# Patient Record
Sex: FEMALE | Race: WHITE
[De-identification: names, ages, dates, MRNs, and addresses within clinical notes are randomized per-mention and may not be internally consistent; named-entity substitution may affect disease eponyms.]

---

## 2019-04-12 ENCOUNTER — HOSPITAL ENCOUNTER (OUTPATIENT)
Age: 64
End: 2019-04-12
Payer: COMMERCIAL

## 2019-04-12 DIAGNOSIS — Z12.31: Primary | ICD-10-CM

## 2019-04-12 DIAGNOSIS — Z80.3: ICD-10-CM

## 2019-04-12 PROCEDURE — 77063 BREAST TOMOSYNTHESIS BI: CPT

## 2019-04-12 PROCEDURE — 77067 SCR MAMMO BI INCL CAD: CPT

## 2019-04-12 NOTE — DI.MG.S_ITS
BILATERAL DIGITAL SCREENING MAMMOGRAM 3D/2D WITH CAD: 4/12/2019  
   
CLINICAL: Routine screening. Family history of breast cancer.    
   
Comparison is made to exams dated:  4/14/2017 mammogram, 12/17/2014 mammogram, and   
8/8/2012 mammogram - [facility].  There are scattered fibroglandular elements in   
both breasts.    
   
Current study was also evaluated with a Computer Aided Detection (CAD) system.    
No significant masses, calcifications, or other findings are seen in either breast.    
There has been no significant interval change.  
   
IMPRESSION: NEGATIVE  
There is no mammographic evidence of malignancy. A 1 year screening mammogram is   
recommended.     
   
This exam was interpreted at Station ID: [id number].    
   
NOTE: For mammograms, a report in lay terms will be sent to the patient. Approximately   
15% of breast malignancies will not be visualized mammographically. In the management of   
a palpable breast mass, a negative mammogram must not discourage biopsy of a clinically   
suspicious lesion.  
   
Electronically Signed By: Matt campbell/sugar:4/12/2019 19:55:04    
   
   
letter sent: Normal Exam    
ACR BI-RADS Category 1: Negative 3341F

## 2022-06-16 ENCOUNTER — HOSPITAL ENCOUNTER (OUTPATIENT)
Age: 67
End: 2022-06-16
Payer: MEDICARE

## 2022-06-16 DIAGNOSIS — H92.10: Primary | ICD-10-CM

## 2022-06-16 PROCEDURE — 87070 CULTURE OTHR SPECIMN AEROBIC: CPT

## 2022-06-16 PROCEDURE — 87147 CULTURE TYPE IMMUNOLOGIC: CPT

## 2022-06-16 PROCEDURE — 87077 CULTURE AEROBIC IDENTIFY: CPT

## 2022-06-16 PROCEDURE — 87205 SMEAR GRAM STAIN: CPT

## 2023-06-19 ENCOUNTER — HOSPITAL ENCOUNTER (OUTPATIENT)
Age: 68
End: 2023-06-19
Payer: MEDICARE

## 2023-06-19 DIAGNOSIS — J02.9: Primary | ICD-10-CM

## 2023-06-19 PROCEDURE — 87070 CULTURE OTHR SPECIMN AEROBIC: CPT

## 2023-06-22 ENCOUNTER — HOSPITAL ENCOUNTER (OUTPATIENT)
Age: 68
End: 2023-06-22
Payer: MEDICARE

## 2023-06-22 DIAGNOSIS — J02.9: ICD-10-CM

## 2023-06-22 DIAGNOSIS — R53.83: Primary | ICD-10-CM

## 2023-06-22 LAB
ADD MANUAL DIFF / SLIDE REVIEW: NO
ALBUMIN SERPL-MCNC: 2.7 G/DL (ref 3.5–5)
ALBUMIN/GLOB SERPL: 1 {RATIO} (ref 1–2.8)
ALP SERPL-CCNC: 539 U/L (ref 38–126)
ALT SERPL-CCNC: 108 IU/L (ref ?–35)
BUN SERPL-MCNC: 11 MG/DL (ref 7–17)
CALCIUM SERPL-MCNC: 8.2 MG/DL (ref 8.4–10.2)
CHLORIDE SERPL-SCNC: 94 MMOL/L (ref 98–107)
CO2 SERPL-SCNC: 29 MMOL/L (ref 22–32)
ESTIMATED GLOMERULAR FILT RATE: > 60 ML/MIN (ref 60–?)
GLOBULIN SER CALC-MCNC: 2.6 G/DL (ref 1.7–4.1)
GLUCOSE SERPL-MCNC: 127 MG/DL (ref 80–110)
HEMATOCRIT: 28.9 % (ref 36–46)
HEMOGLOBIN: 9.9 G/DL (ref 12–16)
HEMOLYSIS: < 15 (ref 0–50)
LYMPHOCYTES # SPEC AUTO: 700 /UL (ref 1100–4500)
MCV RBC: 86.1 FL (ref 80–100)
MEAN CORPUSCULAR HEMOGLOBIN: 29.3 PG (ref 26–34)
MEAN CORPUSCULAR HGB CONC: 34.1 % (ref 30–36)
PLATELET COUNT: 396 X10^3/UL (ref 150–400)
POTASSIUM SERPL-SCNC: 3.1 MMOL/L (ref 3.4–5.1)
PROT SERPL-MCNC: 5.3 G/DL (ref 6.3–8.2)
SODIUM SERPL-SCNC: 131 MMOL/L (ref 137–145)
TSH W/ REFLEX TO FT4: 1.3 UIU/ML (ref 0.47–4.68)

## 2023-06-22 PROCEDURE — 80053 COMPREHEN METABOLIC PANEL: CPT

## 2023-06-22 PROCEDURE — 85025 COMPLETE CBC W/AUTO DIFF WBC: CPT

## 2023-06-22 PROCEDURE — 84443 ASSAY THYROID STIM HORMONE: CPT

## 2023-06-23 ENCOUNTER — HOSPITAL ENCOUNTER (OUTPATIENT)
Age: 68
End: 2023-06-23
Payer: MEDICARE

## 2023-06-23 DIAGNOSIS — J90: ICD-10-CM

## 2023-06-23 DIAGNOSIS — Z80.3: ICD-10-CM

## 2023-06-23 DIAGNOSIS — R53.83: ICD-10-CM

## 2023-06-23 DIAGNOSIS — R91.8: ICD-10-CM

## 2023-06-23 DIAGNOSIS — R05.9: ICD-10-CM

## 2023-06-23 DIAGNOSIS — Z12.31: Primary | ICD-10-CM

## 2023-06-23 DIAGNOSIS — R93.89: ICD-10-CM

## 2023-06-23 PROCEDURE — 71260 CT THORAX DX C+: CPT

## 2023-06-23 PROCEDURE — 77067 SCR MAMMO BI INCL CAD: CPT

## 2023-06-23 PROCEDURE — 77063 BREAST TOMOSYNTHESIS BI: CPT

## 2023-06-26 ENCOUNTER — HOSPITAL ENCOUNTER (OUTPATIENT)
Age: 68
End: 2023-06-26
Payer: MEDICARE

## 2023-06-26 DIAGNOSIS — R53.83: ICD-10-CM

## 2023-06-26 DIAGNOSIS — R53.81: Primary | ICD-10-CM

## 2023-06-26 LAB
APPEARANCE UR: CLEAR
BILIRUBIN URINE UA: (no result)
COLOR UR: YELLOW
CULTURE INDICATED URINE: (no result)
GLUCOSE URINE UA: NEGATIVE G/DL
HGB UR QL: (no result)
KETONES URINE UA: (no result)
LEUKOCYTE ESTERASE URINE UA: (no result)
NITRITE URINE UA: NEGATIVE
PH UR: 7 [PH] (ref 4.5–8)
PROTEIN URINE UA: (no result)
SP GR UR: 1.01 (ref 1–1.03)
UROBILINOGEN UR QL: >=8 E.U./DL

## 2023-06-26 PROCEDURE — 87086 URINE CULTURE/COLONY COUNT: CPT

## 2023-06-26 PROCEDURE — 81015 MICROSCOPIC EXAM OF URINE: CPT

## 2023-06-26 PROCEDURE — 81003 URINALYSIS AUTO W/O SCOPE: CPT

## 2023-06-27 ENCOUNTER — HOSPITAL ENCOUNTER (OUTPATIENT)
Age: 68
End: 2023-06-27
Payer: MEDICARE

## 2023-06-27 DIAGNOSIS — Z12.11: Primary | ICD-10-CM

## 2023-06-27 PROCEDURE — 82274 ASSAY TEST FOR BLOOD FECAL: CPT

## 2023-06-29 ENCOUNTER — HOSPITAL ENCOUNTER (OUTPATIENT)
Age: 68
End: 2023-06-29
Payer: MEDICARE

## 2023-06-29 DIAGNOSIS — R91.8: Primary | ICD-10-CM

## 2023-06-29 DIAGNOSIS — R05.9: ICD-10-CM

## 2023-06-29 DIAGNOSIS — J90: ICD-10-CM

## 2023-06-29 DIAGNOSIS — R29.898: ICD-10-CM

## 2023-06-29 DIAGNOSIS — K57.30: ICD-10-CM

## 2023-06-29 DIAGNOSIS — R53.81: ICD-10-CM

## 2023-06-29 DIAGNOSIS — R53.83: ICD-10-CM

## 2023-06-29 DIAGNOSIS — R93.89: ICD-10-CM

## 2023-06-29 DIAGNOSIS — F68.8: ICD-10-CM

## 2023-06-29 PROCEDURE — 93970 EXTREMITY STUDY: CPT

## 2023-06-29 PROCEDURE — 74177 CT ABD & PELVIS W/CONTRAST: CPT

## 2023-06-29 PROCEDURE — 70470 CT HEAD/BRAIN W/O & W/DYE: CPT

## 2023-07-06 ENCOUNTER — HOSPITAL ENCOUNTER (OUTPATIENT)
Age: 68
End: 2023-07-06
Payer: MEDICARE

## 2023-07-06 DIAGNOSIS — R06.02: ICD-10-CM

## 2023-07-06 DIAGNOSIS — R91.8: ICD-10-CM

## 2023-07-06 DIAGNOSIS — D64.9: ICD-10-CM

## 2023-07-06 DIAGNOSIS — R79.89: ICD-10-CM

## 2023-07-06 DIAGNOSIS — R93.89: ICD-10-CM

## 2023-07-06 DIAGNOSIS — R53.83: Primary | ICD-10-CM

## 2023-07-06 LAB
ADD MANUAL DIFF / SLIDE REVIEW: NO
ALBUMIN SERPL-MCNC: 2.7 G/DL (ref 3.5–5)
ALBUMIN/GLOB SERPL: 1 {RATIO} (ref 1–2.8)
ALP SERPL-CCNC: 499 U/L (ref 38–126)
ALT SERPL-CCNC: 44 IU/L (ref ?–35)
FERRITIN SERPL-MCNC: 673 NG/ML (ref 11–264)
GLOBULIN SER CALC-MCNC: 2.8 G/DL (ref 1.7–4.1)
HEMATOCRIT: 24.4 % (ref 36–46)
HEMOGLOBIN: 8.2 G/DL (ref 12–16)
HEMOLYSIS: 19 (ref 0–50)
HEMOLYSIS: < 15 (ref 0–50)
IRON SERPL-MCNC: 31 UG/DL (ref 37–170)
LYMPHOCYTES # SPEC AUTO: 600 /UL (ref 1100–4500)
MCV RBC: 83.6 FL (ref 80–100)
MEAN CORPUSCULAR HEMOGLOBIN: 28.2 PG (ref 26–34)
MEAN CORPUSCULAR HGB CONC: 33.7 % (ref 30–36)
PERCENT IRON SATURATION: 14 % (ref 15–50)
PLATELET COUNT: 461 X10^3/UL (ref 150–400)
PROT SERPL-MCNC: 5.5 G/DL (ref 6.3–8.2)
TIBC SERPL-MCNC: 228 UG/DL (ref 265–497)
TRANSFERRIN SERPL-MCNC: 132 MG/DL (ref 206–381)
TSH W/ REFLEX TO FT4: 3.31 UIU/ML (ref 0.47–4.68)

## 2023-07-06 PROCEDURE — 85025 COMPLETE CBC W/AUTO DIFF WBC: CPT

## 2023-07-06 PROCEDURE — 86256 FLUORESCENT ANTIBODY TITER: CPT

## 2023-07-06 PROCEDURE — 85651 RBC SED RATE NONAUTOMATED: CPT

## 2023-07-06 PROCEDURE — 83550 IRON BINDING TEST: CPT

## 2023-07-06 PROCEDURE — 80076 HEPATIC FUNCTION PANEL: CPT

## 2023-07-06 PROCEDURE — 83540 ASSAY OF IRON: CPT

## 2023-07-06 PROCEDURE — 80053 COMPREHEN METABOLIC PANEL: CPT

## 2023-07-06 PROCEDURE — 82728 ASSAY OF FERRITIN: CPT

## 2023-07-06 PROCEDURE — 86140 C-REACTIVE PROTEIN: CPT

## 2023-07-06 PROCEDURE — 84443 ASSAY THYROID STIM HORMONE: CPT

## 2023-07-11 LAB
BUN SERPL-MCNC: 21 MG/DL (ref 7–17)
CALCIUM SERPL-MCNC: 8.3 MG/DL (ref 8.4–10.2)
CHLORIDE SERPL-SCNC: 95 MMOL/L (ref 98–107)
CO2 SERPL-SCNC: 28 MMOL/L (ref 22–32)
ESTIMATED GLOMERULAR FILT RATE: 47 ML/MIN (ref 60–?)
GLUCOSE SERPL-MCNC: 116 MG/DL (ref 80–110)
POTASSIUM SERPL-SCNC: 3.1 MMOL/L (ref 3.4–5.1)
SODIUM SERPL-SCNC: 132 MMOL/L (ref 137–145)

## 2023-07-14 ENCOUNTER — HOSPITAL ENCOUNTER (OUTPATIENT)
Age: 68
End: 2023-07-14
Payer: MEDICARE

## 2023-07-14 DIAGNOSIS — R06.02: ICD-10-CM

## 2023-07-14 DIAGNOSIS — J98.8: ICD-10-CM

## 2023-07-14 DIAGNOSIS — R91.8: Primary | ICD-10-CM

## 2023-07-14 PROCEDURE — 94726 PLETHYSMOGRAPHY LUNG VOLUMES: CPT

## 2023-07-14 PROCEDURE — 94060 EVALUATION OF WHEEZING: CPT

## 2023-07-14 PROCEDURE — 94729 DIFFUSING CAPACITY: CPT

## 2023-07-25 ENCOUNTER — HOSPITAL ENCOUNTER (OUTPATIENT)
Age: 68
End: 2023-07-25
Payer: MEDICARE

## 2023-07-25 DIAGNOSIS — R53.81: ICD-10-CM

## 2023-07-25 DIAGNOSIS — R53.83: ICD-10-CM

## 2023-07-25 DIAGNOSIS — R31.9: Primary | ICD-10-CM

## 2023-07-25 LAB
APPEARANCE UR: CLEAR
BILIRUBIN URINE UA: NEGATIVE
COLOR UR: YELLOW
GLUCOSE URINE UA: NEGATIVE G/DL
HGB UR QL: (no result)
KETONES URINE UA: NEGATIVE
LEUKOCYTE ESTERASE URINE UA: NEGATIVE
NITRITE URINE UA: NEGATIVE
PH UR: 6 [PH] (ref 4.5–8)
PROTEIN URINE UA: (no result)
SP GR UR: 1.01 (ref 1–1.03)
UROBILINOGEN UR QL: 0.2 E.U./DL

## 2023-07-25 PROCEDURE — 81001 URINALYSIS AUTO W/SCOPE: CPT

## 2023-08-24 ENCOUNTER — HOSPITAL ENCOUNTER (OUTPATIENT)
Age: 68
End: 2023-08-24
Payer: MEDICARE

## 2023-08-24 DIAGNOSIS — R06.02: Primary | ICD-10-CM

## 2023-08-24 LAB
BUN SERPL-MCNC: 50 MG/DL (ref 7–17)
CALCIUM SERPL-MCNC: 8.1 MG/DL (ref 8.4–10.2)
CHLORIDE SERPL-SCNC: 99 MMOL/L (ref 98–107)
CO2 SERPL-SCNC: 22 MMOL/L (ref 22–32)
ESTIMATED GLOMERULAR FILT RATE: 14 ML/MIN (ref 60–?)
GLUCOSE SERPL-MCNC: 186 MG/DL (ref 80–110)
HEMOLYSIS: < 15 (ref 0–50)
POTASSIUM SERPL-SCNC: 3.3 MMOL/L (ref 3.4–5.1)
SODIUM SERPL-SCNC: 132 MMOL/L (ref 137–145)

## 2023-08-24 PROCEDURE — 80048 BASIC METABOLIC PNL TOTAL CA: CPT

## 2023-10-23 ENCOUNTER — HOSPITAL ENCOUNTER (OUTPATIENT)
Age: 68
End: 2023-10-23
Payer: MEDICARE

## 2023-10-23 DIAGNOSIS — D70.9: ICD-10-CM

## 2023-10-23 DIAGNOSIS — B44.9: Primary | ICD-10-CM

## 2023-10-23 LAB
ADD MANUAL DIFF / SLIDE REVIEW: YES
ANISOCYTOSIS BLD QL: (no result)
BAND NEUTROPHILS PERCENT: 12 % (ref 3–7)
HEMATOCRIT: 35.3 % (ref 36–46)
HEMOGLOBIN: 12.2 G/DL (ref 12–16)
LYMPHOCYTES PERCENT MANUAL: 15 % (ref 25–45)
MCV RBC: 94.3 FL (ref 80–100)
MEAN CORPUSCULAR HEMOGLOBIN: 32.5 PG (ref 26–34)
MEAN CORPUSCULAR HGB CONC: 34.5 % (ref 30–36)
METAMYELOCYTES PERCENT: 1 % (ref ?–0)
MONOCYTES PERCENT MANUAL: 24 % (ref 2–11)
MYELOCYTES PERCENT: 8 % (ref ?–0)
NEUTROPHILS ABSOLUTE MANUAL: 1196 /UL (ref 3000–5900)
NEUTS SEG NFR BLD: 40 % (ref 38–70)
PLATELET COUNT: 508 X10^3/UL (ref 150–400)
TOTAL CELLS COUNTED: 100

## 2023-10-23 PROCEDURE — 85025 COMPLETE CBC W/AUTO DIFF WBC: CPT

## 2023-10-23 PROCEDURE — 85007 BL SMEAR W/DIFF WBC COUNT: CPT

## 2023-10-30 ENCOUNTER — HOSPITAL ENCOUNTER (OUTPATIENT)
Age: 68
End: 2023-10-30
Payer: MEDICARE

## 2023-10-30 DIAGNOSIS — B44.9: Primary | ICD-10-CM

## 2023-10-30 DIAGNOSIS — D70.9: ICD-10-CM

## 2023-10-30 PROCEDURE — 80299 QUANTITATIVE ASSAY DRUG: CPT

## 2024-09-10 ENCOUNTER — HOSPITAL ENCOUNTER (OUTPATIENT)
Age: 69
End: 2024-09-10
Payer: MEDICARE

## 2024-09-10 DIAGNOSIS — R92.323: ICD-10-CM

## 2024-09-10 DIAGNOSIS — Z12.31: Primary | ICD-10-CM

## 2024-09-10 DIAGNOSIS — Z80.3: ICD-10-CM

## 2024-09-10 PROCEDURE — 77063 BREAST TOMOSYNTHESIS BI: CPT

## 2024-09-10 PROCEDURE — 77067 SCR MAMMO BI INCL CAD: CPT

## 2024-09-10 NOTE — DI.MG.S_ITS
BILATERAL DIGITAL SCREENING MAMMOGRAM 3D/2D WITH CAD: 9/10/2024 
  
CLINICAL: Routine screening. Family history of breast cancer.   
  
Comparison is made to exams dated:  6/23/2023 mammogram, 5/5/2017 mammogram, 4/12/2019  
mammogram, and 4/14/2017 mammogram - [facility].   
  
There are scattered areas of fibroglandular density (category b / 25%-50% glandular  
tissue).   
  
Current study was also evaluated with a Computer Aided Detection (CAD) system.   
No significant masses, calcifications, or other findings are seen in either breast.   
There has been no significant interval change. 
  
IMPRESSION: NEGATIVE 
There is no mammographic evidence of malignancy. A 1 year screening mammogram is  
recommended.   
  
Based on the Tyrer Cuzick model (a risk assessment model) the patient's lifetime risk is  
10.0% and her 10 year risk is 6.0%. According to the ACR, ACS, and NCCN guidelines, an  
annual breast MRI exam along with mammogram is recommended if the patient's lifetime risk  
is 20% or greater. 
  
  
This exam was interpreted at Station ID: [id number].   
  
NOTE: For mammograms, a report in lay terms will be sent to the patient. Approximately  
15% of breast malignancies will not be visualized mammographically. In the management of  
a palpable breast mass, a negative mammogram must not discourage biopsy of a clinically  
suspicious lesion. 
  
Electronically Signed By: Lorraine Lion M.D., Ph.D.           
faraz/sugar:9/11/2024 09:17:10   
  
  
letter sent: Normal Exam   
ACR BI-RADS Category 1: Negative 3341F

## 2025-01-08 ENCOUNTER — HOSPITAL ENCOUNTER (OUTPATIENT)
Age: 70
End: 2025-01-08
Payer: MEDICARE

## 2025-01-08 DIAGNOSIS — Z12.11: Primary | ICD-10-CM

## 2025-01-08 PROCEDURE — 82274 ASSAY TEST FOR BLOOD FECAL: CPT

## 2025-01-09 ENCOUNTER — HOSPITAL ENCOUNTER (OUTPATIENT)
Age: 70
End: 2025-01-09
Payer: MEDICARE

## 2025-01-09 DIAGNOSIS — Z79.899: ICD-10-CM

## 2025-01-09 DIAGNOSIS — Z11.59: ICD-10-CM

## 2025-01-09 DIAGNOSIS — Z13.6: Primary | ICD-10-CM

## 2025-01-09 LAB
25(OH)D3+25(OH)D2 SERPL-MCNC: 79.8 NG/ML (ref 30–100)
CHOLEST SERPL-MCNC: 404 MG/DL (ref 140–199)
HCV AB SERPL QL IA: NEGATIVE S/C
HDLC SERPL-MCNC: 91 MG/DL (ref 40–60)
TRIGL SERPL-MCNC: 163 MG/DL (ref 35–150)

## 2025-01-09 PROCEDURE — 86803 HEPATITIS C AB TEST: CPT

## 2025-01-09 PROCEDURE — 80061 LIPID PANEL: CPT

## 2025-01-09 PROCEDURE — 82306 VITAMIN D 25 HYDROXY: CPT

## 2025-01-20 ENCOUNTER — HOSPITAL ENCOUNTER (OUTPATIENT)
Age: 70
End: 2025-01-20
Payer: MEDICARE

## 2025-01-20 DIAGNOSIS — M81.0: Primary | ICD-10-CM

## 2025-01-20 DIAGNOSIS — Z78.0: ICD-10-CM

## 2025-01-20 PROCEDURE — 77080 DXA BONE DENSITY AXIAL: CPT

## 2025-01-20 NOTE — DI.RAD.S_ITS
PROCEDURE:  XR DEXA AXIAL SKELETON 
  
INDICATIONS:    Screenings 
  
COMPARISON:  None. 
  
FINDINGS:    
  
Lumbar Spine:  Bone mineral density 0.739 g/cm2, T score -2.8. 
  
Left Femoral Neck:  Bone mineral density 0.730 g/cm2, T score -1.1. 
  
Left Hip:  Bone mineral density 0.733 g/cm2, T score -1.7. 
  
  
Fracture Risk Calculation (when applicable):   
FRAX score not reported due to T-score less than -2.5. 
  
(T score greater or equal to -1.0 to: NORMAL) 
(T score from -1.1 to -2.4: OSTEOPENIA) 
(T score less than or equal to -2.5: OSTEOPOROSIS) 
  
IMPRESSION:   
By WHO criteria, patient has osteoporosis. 
  
Follow-up guidelines as follows: 
Osteoporosis: Consider a repeat DEXA and Vertebral Fracture Assessment (VFA) exam in 2  
years or sooner if medically necessary, to reassess this patient's status. 
Osteopenia: Consider a repeat DEXA in 2-3 years to reassess this patient's status, or if  
there is a new clinical indication. 
Normal: Consider a repeat DEXA in 5 years or sooner, or if there is a new clinical  
indication. 
  
All treatment decisions require clinical judgment and consideration of individual patient  
factors, including patient preferences, comorbidities, previous drug use, risk factors  
not captured in the FRAX model (e.g., frailty, falls, vitamin D deficiency, increased  
bone turnover, interval significant decline in bone density ) and possible under- or  
over-estimation of fracture risk by FRAX. 
  
In addition, the NOF Guide recommends that FDA-approved medical therapies be considered  
in postmenopausal women and men age >= 50 years with a: 
* Hip or vertebral (clinical or morphometric) fracture 
* T-score of <=-2.5 at the spine or hip 
* Ten-year fracture probability by FRAX of >= 3% for hip fracture or >=20% for major  
osteoporotic fracture.  
  
  
  
Approved by: Tim Sandoval M.D. on 1/20/2025 at 23:35